# Patient Record
Sex: FEMALE | Race: WHITE | URBAN - METROPOLITAN AREA
[De-identification: names, ages, dates, MRNs, and addresses within clinical notes are randomized per-mention and may not be internally consistent; named-entity substitution may affect disease eponyms.]

---

## 2020-06-01 ENCOUNTER — IMPORTED ENCOUNTER (OUTPATIENT)
Dept: URBAN - METROPOLITAN AREA CLINIC 50 | Facility: CLINIC | Age: 70
End: 2020-06-01

## 2020-06-03 ENCOUNTER — IMPORTED ENCOUNTER (OUTPATIENT)
Dept: URBAN - METROPOLITAN AREA CLINIC 50 | Facility: CLINIC | Age: 70
End: 2020-06-03

## 2020-06-04 ENCOUNTER — IMPORTED ENCOUNTER (OUTPATIENT)
Dept: URBAN - METROPOLITAN AREA CLINIC 50 | Facility: CLINIC | Age: 70
End: 2020-06-04

## 2021-04-05 ENCOUNTER — IMPORTED ENCOUNTER (OUTPATIENT)
Dept: URBAN - METROPOLITAN AREA CLINIC 50 | Facility: CLINIC | Age: 71
End: 2021-04-05

## 2021-04-17 ASSESSMENT — VISUAL ACUITY
OD_CC: 20/25
OS_CC: J1+@ 16 IN
OD_CC: 20/25
OD_CC: J1+
OS_BAT: 20/40
OS_CC: J1+
OS_OTHER: 20/40. 20/70.
OS_CC: 20/25
OS_BAT: 20/70
OD_OTHER: 20/60. 20/100.
OD_BAT: 20/40
OS_CC: 20/30
OS_OTHER: 20/70. 20/100.
OD_CC: J1+@ 16 IN
OD_OTHER: 20/40. 20/60.
OD_BAT: 20/60

## 2021-04-17 ASSESSMENT — TONOMETRY
OS_IOP_MMHG: 16
OS_IOP_MMHG: 14
OD_IOP_MMHG: 15
OD_IOP_MMHG: 14

## 2022-04-05 ENCOUNTER — PREPPED CHART (OUTPATIENT)
Dept: URBAN - METROPOLITAN AREA CLINIC 49 | Facility: CLINIC | Age: 72
End: 2022-04-05

## 2022-04-20 ENCOUNTER — ESTABLISHED PATIENT (OUTPATIENT)
Dept: URBAN - METROPOLITAN AREA CLINIC 49 | Facility: CLINIC | Age: 72
End: 2022-04-20

## 2022-04-20 DIAGNOSIS — H04.123: ICD-10-CM

## 2022-04-20 PROCEDURE — 92014 COMPRE OPH EXAM EST PT 1/>: CPT

## 2022-04-20 ASSESSMENT — VISUAL ACUITY
OD_CC: 20/30-2
OS_CC: 20/25-2

## 2022-04-20 ASSESSMENT — TONOMETRY
OD_IOP_MMHG: 15
OS_IOP_MMHG: 16

## 2022-05-18 ENCOUNTER — COMPREHENSIVE EXAM (OUTPATIENT)
Dept: URBAN - METROPOLITAN AREA CLINIC 49 | Facility: CLINIC | Age: 72
End: 2022-05-18

## 2022-05-18 DIAGNOSIS — H25.13: ICD-10-CM

## 2022-05-18 DIAGNOSIS — H35.372: ICD-10-CM

## 2022-05-18 DIAGNOSIS — H35.363: ICD-10-CM

## 2022-05-18 PROCEDURE — 92015 DETERMINE REFRACTIVE STATE: CPT

## 2022-05-18 PROCEDURE — 92134 CPTRZ OPH DX IMG PST SGM RTA: CPT

## 2022-05-18 PROCEDURE — 92014 COMPRE OPH EXAM EST PT 1/>: CPT

## 2022-05-18 ASSESSMENT — VISUAL ACUITY
OD_CC: 20/25
OU_CC: J1+
OS_CC: 20/25
OS_GLARE: 20/40
OD_GLARE: >20/400
OD_GLARE: 20/25
OS_GLARE: >20/400

## 2022-05-18 ASSESSMENT — TONOMETRY
OS_IOP_MMHG: 13
OD_IOP_MMHG: 13

## 2023-05-17 ENCOUNTER — COMPREHENSIVE EXAM (OUTPATIENT)
Dept: URBAN - METROPOLITAN AREA CLINIC 49 | Facility: CLINIC | Age: 73
End: 2023-05-17

## 2023-05-17 DIAGNOSIS — H02.834: ICD-10-CM

## 2023-05-17 DIAGNOSIS — H35.372: ICD-10-CM

## 2023-05-17 DIAGNOSIS — H02.831: ICD-10-CM

## 2023-05-17 DIAGNOSIS — H25.13: ICD-10-CM

## 2023-05-17 DIAGNOSIS — H35.363: ICD-10-CM

## 2023-05-17 PROCEDURE — 92134 CPTRZ OPH DX IMG PST SGM RTA: CPT

## 2023-05-17 PROCEDURE — 92014 COMPRE OPH EXAM EST PT 1/>: CPT

## 2023-05-17 ASSESSMENT — VISUAL ACUITY
OU_CC: J1+
OU_CC: 20/25
OD_GLARE: 20/50
OS_CC: 20/25
OD_CC: J1
OS_GLARE: 20/40
OD_GLARE: 20/40
OD_CC: 20/25
OS_GLARE: 20/30
OS_CC: J1

## 2023-05-17 ASSESSMENT — TONOMETRY
OD_IOP_MMHG: 15
OS_IOP_MMHG: 14

## 2023-06-12 ENCOUNTER — PRE-OP/H&P (OUTPATIENT)
Dept: URBAN - METROPOLITAN AREA CLINIC 53 | Facility: CLINIC | Age: 73
End: 2023-06-12

## 2023-06-12 DIAGNOSIS — H35.372: ICD-10-CM

## 2023-06-12 DIAGNOSIS — H25.13: ICD-10-CM

## 2023-06-12 PROCEDURE — 92025-3 CORNEAL TOPO, REFUSED

## 2023-06-12 PROCEDURE — 92134 CPTRZ OPH DX IMG PST SGM RTA: CPT

## 2023-06-12 PROCEDURE — 92136 OPHTHALMIC BIOMETRY: CPT

## 2023-06-12 PROCEDURE — PREOP PRE OP VISIT

## 2023-06-12 RX ORDER — VALACYCLOVIR HYDROCHLORIDE 500 MG/1: TABLET, FILM COATED ORAL TWICE A DAY

## 2023-06-12 ASSESSMENT — KERATOMETRY
OD_AXISANGLE2_DEGREES: 36
OS_K1POWER_DIOPTERS: 45.25
OD_K2POWER_DIOPTERS: 43.75
OS_K2POWER_DIOPTERS: 44.25
OD_AXISANGLE_DEGREES: 126
OD_K1POWER_DIOPTERS: 44.50
OS_AXISANGLE_DEGREES: 046
OS_AXISANGLE2_DEGREES: 136

## 2023-06-12 ASSESSMENT — TONOMETRY
OS_IOP_MMHG: 17
OD_IOP_MMHG: 17

## 2023-06-12 ASSESSMENT — VISUAL ACUITY
OS_CC: 20/25
OD_CC: 20/25

## 2023-06-20 ASSESSMENT — KERATOMETRY
OS_K1POWER_DIOPTERS: 45.25
OD_K2POWER_DIOPTERS: 43.75
OS_K2POWER_DIOPTERS: 44.25
OD_AXISANGLE_DEGREES: 126
OS_AXISANGLE2_DEGREES: 136
OD_AXISANGLE2_DEGREES: 36
OD_K1POWER_DIOPTERS: 44.50
OS_AXISANGLE_DEGREES: 046

## 2023-06-21 ENCOUNTER — SURGERY/PROCEDURE (OUTPATIENT)
Dept: URBAN - METROPOLITAN AREA SURGERY 16 | Facility: SURGERY | Age: 73
End: 2023-06-21

## 2023-06-21 ENCOUNTER — POST-OP (OUTPATIENT)
Dept: URBAN - METROPOLITAN AREA CLINIC 53 | Facility: CLINIC | Age: 73
End: 2023-06-21

## 2023-06-21 DIAGNOSIS — Z96.1: ICD-10-CM

## 2023-06-21 DIAGNOSIS — H25.11: ICD-10-CM

## 2023-06-21 DIAGNOSIS — Z98.41: ICD-10-CM

## 2023-06-21 PROCEDURE — 66984 XCAPSL CTRC RMVL W/O ECP: CPT

## 2023-06-21 ASSESSMENT — TONOMETRY: OD_IOP_MMHG: 8

## 2023-06-21 ASSESSMENT — KERATOMETRY
OD_K2POWER_DIOPTERS: 43.75
OD_AXISANGLE_DEGREES: 126
OS_AXISANGLE_DEGREES: 046
OD_AXISANGLE2_DEGREES: 36
OD_K1POWER_DIOPTERS: 44.50
OS_K2POWER_DIOPTERS: 44.25
OS_AXISANGLE2_DEGREES: 136
OS_K1POWER_DIOPTERS: 45.25

## 2023-06-21 ASSESSMENT — VISUAL ACUITY: OD_SC: 20/100

## 2023-06-30 ENCOUNTER — POST OP/EVAL OF SECOND EYE (OUTPATIENT)
Dept: URBAN - METROPOLITAN AREA CLINIC 49 | Facility: CLINIC | Age: 73
End: 2023-06-30

## 2023-06-30 DIAGNOSIS — H25.12: ICD-10-CM

## 2023-06-30 PROCEDURE — 92136 - 2N OPHTHALMIC BIOMETRY BY PARTIAL COHERENCE INTERFEROMETRY WITH INTRAOCULAR LENS POWER CALCULATION

## 2023-06-30 PROCEDURE — 99213 OFFICE O/P EST LOW 20 MIN: CPT

## 2023-06-30 ASSESSMENT — VISUAL ACUITY
OD_PH: 20/40
OD_SC: 20/50
OS_CC: 20/40
OS_GLARE: 20/40
OS_GLARE: 20/30

## 2023-06-30 ASSESSMENT — TONOMETRY
OD_IOP_MMHG: 17
OS_IOP_MMHG: 17

## 2023-06-30 ASSESSMENT — KERATOMETRY
OS_K1POWER_DIOPTERS: 45.25
OS_K2POWER_DIOPTERS: 44.25
OS_AXISANGLE_DEGREES: 046
OD_AXISANGLE_DEGREES: 126
OD_K1POWER_DIOPTERS: 44.50
OS_AXISANGLE2_DEGREES: 136
OD_AXISANGLE2_DEGREES: 36
OD_K2POWER_DIOPTERS: 43.75

## 2023-07-05 ENCOUNTER — SURGERY/PROCEDURE (OUTPATIENT)
Dept: URBAN - METROPOLITAN AREA SURGERY 16 | Facility: SURGERY | Age: 73
End: 2023-07-05

## 2023-07-05 ENCOUNTER — POST-OP (OUTPATIENT)
Dept: URBAN - METROPOLITAN AREA CLINIC 53 | Facility: CLINIC | Age: 73
End: 2023-07-05

## 2023-07-05 DIAGNOSIS — H25.12: ICD-10-CM

## 2023-07-05 DIAGNOSIS — Z98.42: ICD-10-CM

## 2023-07-05 PROBLEM — Z96.1: Noted: 2023-06-21

## 2023-07-05 PROBLEM — Z96.1: Noted: 2023-07-05

## 2023-07-05 PROCEDURE — 66984 XCAPSL CTRC RMVL W/O ECP: CPT

## 2023-07-05 ASSESSMENT — KERATOMETRY
OS_AXISANGLE_DEGREES: 046
OD_AXISANGLE_DEGREES: 126
OS_AXISANGLE2_DEGREES: 136
OD_K2POWER_DIOPTERS: 43.75
OD_K1POWER_DIOPTERS: 44.50
OS_K1POWER_DIOPTERS: 45.25
OD_AXISANGLE2_DEGREES: 36
OS_AXISANGLE_DEGREES: 046
OS_K2POWER_DIOPTERS: 44.25
OS_AXISANGLE2_DEGREES: 136
OS_K1POWER_DIOPTERS: 45.25
OS_K2POWER_DIOPTERS: 44.25
OD_K2POWER_DIOPTERS: 43.75
OD_K1POWER_DIOPTERS: 44.50
OD_AXISANGLE_DEGREES: 126
OD_AXISANGLE2_DEGREES: 36

## 2023-07-05 ASSESSMENT — TONOMETRY: OS_IOP_MMHG: 17

## 2023-07-05 ASSESSMENT — VISUAL ACUITY: OS_SC: 20/50

## 2023-07-14 ENCOUNTER — POST-OP (OUTPATIENT)
Dept: URBAN - METROPOLITAN AREA CLINIC 49 | Facility: CLINIC | Age: 73
End: 2023-07-14

## 2023-07-14 DIAGNOSIS — Z98.42: ICD-10-CM

## 2023-07-14 PROCEDURE — 99024 POSTOP FOLLOW-UP VISIT: CPT

## 2023-07-14 ASSESSMENT — KERATOMETRY
OS_K2POWER_DIOPTERS: 45.50
OD_AXISANGLE_DEGREES: 26
OS_AXISANGLE2_DEGREES: 42
OS_K1POWER_DIOPTERS: 44.75
OS_AXISANGLE_DEGREES: 132
OD_AXISANGLE2_DEGREES: 116
OD_K1POWER_DIOPTERS: 44.00
OD_K2POWER_DIOPTERS: 45.00

## 2023-07-14 ASSESSMENT — VISUAL ACUITY
OD_PH: 20/30
OD_SC: 20/50
OU_SC: J16@18"
OS_SC: 20/30

## 2023-07-14 ASSESSMENT — TONOMETRY
OS_IOP_MMHG: 17
OD_IOP_MMHG: 17

## 2023-08-04 ENCOUNTER — POST-OP (OUTPATIENT)
Dept: URBAN - METROPOLITAN AREA CLINIC 49 | Facility: LOCATION | Age: 73
End: 2023-08-04

## 2023-08-04 DIAGNOSIS — Z98.42: ICD-10-CM

## 2023-08-04 DIAGNOSIS — Z96.1: ICD-10-CM

## 2023-08-04 PROCEDURE — 99024 POSTOP FOLLOW-UP VISIT: CPT

## 2023-08-04 ASSESSMENT — VISUAL ACUITY
OU_SC: 20/25
OD_SC: 20/30
OS_SC: 20/25-1
OD_PH: 20/25

## 2023-08-04 ASSESSMENT — TONOMETRY
OD_IOP_MMHG: 16
OS_IOP_MMHG: 16

## 2023-08-16 ENCOUNTER — CONSULTATION/EVALUATION (OUTPATIENT)
Dept: URBAN - METROPOLITAN AREA CLINIC 49 | Facility: LOCATION | Age: 73
End: 2023-08-16

## 2023-08-16 DIAGNOSIS — H02.831: ICD-10-CM

## 2023-08-16 DIAGNOSIS — H02.834: ICD-10-CM

## 2023-08-16 PROCEDURE — 99213 OFFICE O/P EST LOW 20 MIN: CPT

## 2023-08-16 PROCEDURE — 92285 EXTERNAL OCULAR PHOTOGRAPHY: CPT

## 2023-08-16 ASSESSMENT — VISUAL ACUITY
OD_PH: 20/20
OD_SC: 20/30
OS_SC: 20/25

## 2023-08-16 ASSESSMENT — TONOMETRY
OS_IOP_MMHG: 15
OD_IOP_MMHG: 16

## 2023-08-21 ENCOUNTER — DIAGNOSTICS ONLY (OUTPATIENT)
Dept: URBAN - METROPOLITAN AREA CLINIC 49 | Facility: LOCATION | Age: 73
End: 2023-08-21

## 2023-08-21 DIAGNOSIS — H02.834: ICD-10-CM

## 2023-08-21 DIAGNOSIS — H02.831: ICD-10-CM

## 2023-08-21 PROCEDURE — 92285 EXTERNAL OCULAR PHOTOGRAPHY: CPT

## 2023-08-21 PROCEDURE — 92082 INTERMEDIATE VISUAL FIELD XM: CPT

## 2023-10-02 ENCOUNTER — PRE-OP/H&P (OUTPATIENT)
Dept: URBAN - METROPOLITAN AREA CLINIC 49 | Facility: LOCATION | Age: 73
End: 2023-10-02

## 2023-10-02 DIAGNOSIS — H02.834: ICD-10-CM

## 2023-10-02 DIAGNOSIS — H02.831: ICD-10-CM

## 2023-10-02 PROCEDURE — PREOP PRE OP VISIT

## 2023-10-02 ASSESSMENT — VISUAL ACUITY
OS_SC: 20/25
OD_SC: 20/30
OD_PH: 20/20

## 2023-10-17 ENCOUNTER — SURGERY/PROCEDURE (OUTPATIENT)
Dept: URBAN - METROPOLITAN AREA SURGERY 16 | Facility: SURGERY | Age: 73
End: 2023-10-17

## 2023-10-17 DIAGNOSIS — H02.834: ICD-10-CM

## 2023-10-17 DIAGNOSIS — H02.831: ICD-10-CM

## 2023-10-17 PROCEDURE — 15823 50 BLEPHAROPLASTY, UPPER WITH EXTENSIVE SKIN WEIGHING DOWN LID (BILATERAL)

## 2023-10-25 ENCOUNTER — POST-OP (OUTPATIENT)
Dept: URBAN - METROPOLITAN AREA CLINIC 49 | Facility: LOCATION | Age: 73
End: 2023-10-25

## 2023-10-25 DIAGNOSIS — Z98.890: ICD-10-CM

## 2023-10-25 DIAGNOSIS — H26.493: ICD-10-CM

## 2023-10-25 PROCEDURE — 99024 POSTOP FOLLOW-UP VISIT: CPT

## 2023-10-25 PROCEDURE — 66821 AFTER CATARACT LASER SURGERY: CPT | Mod: 79,LT

## 2023-10-25 ASSESSMENT — TONOMETRY
OS_IOP_MMHG: 14
OD_IOP_MMHG: 16
OS_IOP_MMHG: 15

## 2023-10-25 ASSESSMENT — VISUAL ACUITY
OD_GLARE: 20/25
OS_GLARE: 20/40
OS_SC: 20/40
OD_GLARE: 20/40
OS_GLARE: 20/50
OD_SC: 20/40
OD_PH: 20/30

## 2023-10-30 ENCOUNTER — CLINIC PROCEDURE ONLY (OUTPATIENT)
Dept: URBAN - METROPOLITAN AREA CLINIC 49 | Facility: LOCATION | Age: 73
End: 2023-10-30

## 2023-10-30 DIAGNOSIS — Z98.890: ICD-10-CM

## 2023-10-30 DIAGNOSIS — H26.491: ICD-10-CM

## 2023-10-30 PROCEDURE — 66821 AFTER CATARACT LASER SURGERY: CPT | Mod: 79,RT

## 2023-10-30 PROCEDURE — 99024 POSTOP FOLLOW-UP VISIT: CPT | Mod: 25

## 2023-10-30 ASSESSMENT — TONOMETRY
OD_IOP_MMHG: 14
OD_IOP_MMHG: 12
OS_IOP_MMHG: 14

## 2023-10-30 ASSESSMENT — VISUAL ACUITY
OD_PH: 20/30
OS_SC: 20/25
OD_SC: 20/40

## 2023-11-13 ENCOUNTER — POST-OP (OUTPATIENT)
Dept: URBAN - METROPOLITAN AREA CLINIC 49 | Facility: LOCATION | Age: 73
End: 2023-11-13

## 2023-11-13 DIAGNOSIS — H35.361: ICD-10-CM

## 2023-11-13 DIAGNOSIS — H04.123: ICD-10-CM

## 2023-11-13 DIAGNOSIS — Z98.890: ICD-10-CM

## 2023-11-13 DIAGNOSIS — H35.371: ICD-10-CM

## 2023-11-13 DIAGNOSIS — H52.4: ICD-10-CM

## 2023-11-13 PROCEDURE — 92015 DETERMINE REFRACTIVE STATE: CPT

## 2023-11-13 PROCEDURE — 99024 POSTOP FOLLOW-UP VISIT: CPT

## 2023-11-13 ASSESSMENT — TONOMETRY
OD_IOP_MMHG: 14
OS_IOP_MMHG: 14

## 2023-11-13 ASSESSMENT — VISUAL ACUITY
OS_SC: 20/25
OD_PH: 20/25
OD_SC: 20/50-1

## 2024-01-16 ENCOUNTER — APPOINTMENT (RX ONLY)
Dept: URBAN - METROPOLITAN AREA CLINIC 76 | Facility: CLINIC | Age: 74
Setting detail: DERMATOLOGY
End: 2024-01-16

## 2024-01-16 DIAGNOSIS — L81.4 OTHER MELANIN HYPERPIGMENTATION: ICD-10-CM

## 2024-01-16 DIAGNOSIS — L60.3 NAIL DYSTROPHY: ICD-10-CM | Status: INADEQUATELY CONTROLLED

## 2024-01-16 DIAGNOSIS — D22 MELANOCYTIC NEVI: ICD-10-CM

## 2024-01-16 DIAGNOSIS — B35.1 TINEA UNGUIUM: ICD-10-CM

## 2024-01-16 DIAGNOSIS — L82.0 INFLAMED SEBORRHEIC KERATOSIS: ICD-10-CM

## 2024-01-16 DIAGNOSIS — L82.1 OTHER SEBORRHEIC KERATOSIS: ICD-10-CM

## 2024-01-16 DIAGNOSIS — L81.5 LEUKODERMA, NOT ELSEWHERE CLASSIFIED: ICD-10-CM

## 2024-01-16 PROBLEM — D22.62 MELANOCYTIC NEVI OF LEFT UPPER LIMB, INCLUDING SHOULDER: Status: ACTIVE | Noted: 2024-01-16

## 2024-01-16 PROBLEM — D22.61 MELANOCYTIC NEVI OF RIGHT UPPER LIMB, INCLUDING SHOULDER: Status: ACTIVE | Noted: 2024-01-16

## 2024-01-16 PROBLEM — D22.5 MELANOCYTIC NEVI OF TRUNK: Status: ACTIVE | Noted: 2024-01-16

## 2024-01-16 PROBLEM — D22.39 MELANOCYTIC NEVI OF OTHER PARTS OF FACE: Status: ACTIVE | Noted: 2024-01-16

## 2024-01-16 PROBLEM — D22.72 MELANOCYTIC NEVI OF LEFT LOWER LIMB, INCLUDING HIP: Status: ACTIVE | Noted: 2024-01-16

## 2024-01-16 PROBLEM — D22.71 MELANOCYTIC NEVI OF RIGHT LOWER LIMB, INCLUDING HIP: Status: ACTIVE | Noted: 2024-01-16

## 2024-01-16 PROCEDURE — ? COUNSELING

## 2024-01-16 PROCEDURE — 17110 DESTRUCTION B9 LES UP TO 14: CPT

## 2024-01-16 PROCEDURE — ? SUNSCREEN RECOMMENDATIONS

## 2024-01-16 PROCEDURE — ? LIQUID NITROGEN

## 2024-01-16 PROCEDURE — 99203 OFFICE O/P NEW LOW 30 MIN: CPT | Mod: 25

## 2024-01-16 ASSESSMENT — LOCATION SIMPLE DESCRIPTION DERM
LOCATION SIMPLE: NECK
LOCATION SIMPLE: RIGHT CHEEK
LOCATION SIMPLE: LEFT PRETIBIAL REGION
LOCATION SIMPLE: LEFT UPPER ARM
LOCATION SIMPLE: LEFT FOREARM
LOCATION SIMPLE: RIGHT THUMBNAIL
LOCATION SIMPLE: LEFT THIGH
LOCATION SIMPLE: RIGHT LOWER BACK
LOCATION SIMPLE: LEFT 2ND TOE
LOCATION SIMPLE: LEFT SHOULDER
LOCATION SIMPLE: ABDOMEN
LOCATION SIMPLE: LEFT GREAT TOE
LOCATION SIMPLE: RIGHT LIP
LOCATION SIMPLE: RIGHT 2ND TOE
LOCATION SIMPLE: LEFT CALF
LOCATION SIMPLE: RIGHT GREAT TOE
LOCATION SIMPLE: LEFT LOWER BACK
LOCATION SIMPLE: RIGHT CALF
LOCATION SIMPLE: LEFT THUMBNAIL
LOCATION SIMPLE: RIGHT UPPER ARM
LOCATION SIMPLE: LEFT UPPER BACK
LOCATION SIMPLE: CHEST
LOCATION SIMPLE: LEFT CHEEK
LOCATION SIMPLE: RIGHT SHOULDER
LOCATION SIMPLE: RIGHT UPPER BACK
LOCATION SIMPLE: RIGHT POSTERIOR UPPER ARM
LOCATION SIMPLE: RIGHT PRETIBIAL REGION
LOCATION SIMPLE: RIGHT FOREARM

## 2024-01-16 ASSESSMENT — LOCATION DETAILED DESCRIPTION DERM
LOCATION DETAILED: LEFT INFERIOR UPPER BACK
LOCATION DETAILED: RIGHT 2ND TOENAIL
LOCATION DETAILED: RIGHT GREAT TOENAIL
LOCATION DETAILED: RIGHT PROXIMAL DORSAL FOREARM
LOCATION DETAILED: LEFT DISTAL CALF
LOCATION DETAILED: LEFT PROXIMAL CALF
LOCATION DETAILED: LEFT VENTRAL PROXIMAL FOREARM
LOCATION DETAILED: RIGHT ANTERIOR SHOULDER
LOCATION DETAILED: RIGHT LOWER CUTANEOUS LIP
LOCATION DETAILED: LEFT POSTERIOR SHOULDER
LOCATION DETAILED: LEFT INFERIOR CENTRAL MALAR CHEEK
LOCATION DETAILED: RIGHT PROXIMAL PRETIBIAL REGION
LOCATION DETAILED: EPIGASTRIC SKIN
LOCATION DETAILED: RIGHT INFERIOR LATERAL MIDBACK
LOCATION DETAILED: LEFT ANTERIOR DISTAL THIGH
LOCATION DETAILED: RIGHT VENTRAL PROXIMAL FOREARM
LOCATION DETAILED: RIGHT INFERIOR UPPER BACK
LOCATION DETAILED: RIGHT DISTAL PRETIBIAL REGION
LOCATION DETAILED: LEFT PROXIMAL PRETIBIAL REGION
LOCATION DETAILED: LEFT CENTRAL LATERAL NECK
LOCATION DETAILED: RIGHT INFERIOR MEDIAL MALAR CHEEK
LOCATION DETAILED: LEFT DISTAL PRETIBIAL REGION
LOCATION DETAILED: LEFT SUPERIOR UPPER BACK
LOCATION DETAILED: RIGHT ANTERIOR PROXIMAL UPPER ARM
LOCATION DETAILED: LEFT 2ND TOENAIL
LOCATION DETAILED: RIGHT SUPERIOR UPPER BACK
LOCATION DETAILED: LEFT MEDIAL UPPER BACK
LOCATION DETAILED: RIGHT POSTERIOR SHOULDER
LOCATION DETAILED: LEFT SUPERIOR MEDIAL MIDBACK
LOCATION DETAILED: LEFT GREAT TOENAIL
LOCATION DETAILED: RIGHT PROXIMAL CALF
LOCATION DETAILED: LEFT THUMBNAIL
LOCATION DETAILED: LEFT PROXIMAL DORSAL FOREARM
LOCATION DETAILED: LEFT ANTERIOR SHOULDER
LOCATION DETAILED: STERNAL NOTCH
LOCATION DETAILED: RIGHT MID-UPPER BACK
LOCATION DETAILED: RIGHT DISTAL CALF
LOCATION DETAILED: RIGHT PROXIMAL POSTERIOR UPPER ARM
LOCATION DETAILED: RIGHT THUMBNAIL
LOCATION DETAILED: LEFT ANTERIOR PROXIMAL UPPER ARM

## 2024-01-16 ASSESSMENT — LOCATION ZONE DERM
LOCATION ZONE: FACE
LOCATION ZONE: LEG
LOCATION ZONE: LIP
LOCATION ZONE: NECK
LOCATION ZONE: TRUNK
LOCATION ZONE: ARM
LOCATION ZONE: TOENAIL
LOCATION ZONE: FINGERNAIL

## 2024-01-16 NOTE — HPI: FULL BODY SKIN EXAMINATION
What Type Of Note Output Would You Prefer (Optional)?: Standard Output
What Is The Reason For Today's Visit?: Full Body Skin Examination
What Is The Reason For Today's Visit? (Being Monitored For X): the development of a new lesion
Additional History: Pt denies previous full skin. Denies history if skin cancers.

## 2024-01-16 NOTE — PROCEDURE: LIQUID NITROGEN
Render Note In Bullet Format When Appropriate: No
Medical Necessity Information: It is in your best interest to select a reason for this procedure from the list below. All of these items fulfill various CMS LCD requirements except the new and changing color options.
Post-Care Instructions: I reviewed with the patient in detail post-care instructions. Patient is to wear sunprotection, and avoid picking at any of the treated lesions. Pt may apply Vaseline to crusted or scabbing areas.
Detail Level: Simple
Consent: The patient's consent was obtained including but not limited to risks of crusting, scabbing, blistering, scarring, darker or lighter pigmentary change, recurrence, incomplete removal and infection.
Duration Of Freeze Thaw-Cycle (Seconds): 5
Include Z78.9 (Other Specified Conditions Influencing Health Status) As An Associated Diagnosis?: Yes
Number Of Freeze-Thaw Cycles: 2 freeze-thaw cycles
Application Tool (Optional): Liquid Nitrogen Sprayer
Medical Necessity Clause: This procedure was medically necessary because the lesions that were treated were: irritated, itchy and growing
Spray Paint Text: The liquid nitrogen was applied to the skin utilizing a spray paint frosting technique.

## 2024-03-08 ENCOUNTER — COMPREHENSIVE EXAM (OUTPATIENT)
Dept: URBAN - METROPOLITAN AREA CLINIC 52 | Facility: CLINIC | Age: 74
End: 2024-03-08

## 2024-03-08 DIAGNOSIS — R42: ICD-10-CM

## 2024-03-08 DIAGNOSIS — H52.4: ICD-10-CM

## 2024-03-08 DIAGNOSIS — H35.371: ICD-10-CM

## 2024-03-08 PROCEDURE — 92134 CPTRZ OPH DX IMG PST SGM RTA: CPT

## 2024-03-08 PROCEDURE — 92014 COMPRE OPH EXAM EST PT 1/>: CPT

## 2024-03-08 ASSESSMENT — VISUAL ACUITY
OD_CC: 20/20
OU_CC: J1+@16"
OU_CC: 20/20
OS_CC: 20/25

## 2024-03-08 ASSESSMENT — TONOMETRY
OD_IOP_MMHG: 14
OS_IOP_MMHG: 14

## 2024-03-27 ENCOUNTER — CONTACT LENSES/GLASSES VISIT (OUTPATIENT)
Dept: URBAN - METROPOLITAN AREA CLINIC 48 | Facility: LOCATION | Age: 74
End: 2024-03-27

## 2024-03-27 DIAGNOSIS — R42: ICD-10-CM

## 2024-03-27 DIAGNOSIS — H52.4: ICD-10-CM

## 2024-03-27 PROCEDURE — 92015 DETERMINE REFRACTIVE STATE: CPT

## 2024-03-27 ASSESSMENT — VISUAL ACUITY
OD_CC: 20/20
OU_CC: J1+
OS_PH: 20/25
OU_CC: 20/20
OS_CC: 20/40

## 2025-04-18 ENCOUNTER — COMPREHENSIVE EXAM (OUTPATIENT)
Age: 75
End: 2025-04-18

## 2025-04-18 DIAGNOSIS — H35.361: ICD-10-CM

## 2025-04-18 DIAGNOSIS — H35.373: ICD-10-CM

## 2025-04-18 DIAGNOSIS — H04.123: ICD-10-CM

## 2025-04-18 DIAGNOSIS — H52.4: ICD-10-CM

## 2025-04-18 PROCEDURE — 99214 OFFICE O/P EST MOD 30 MIN: CPT

## 2025-04-18 PROCEDURE — 92015 DETERMINE REFRACTIVE STATE: CPT

## 2025-04-18 PROCEDURE — 92134 CPTRZ OPH DX IMG PST SGM RTA: CPT
